# Patient Record
Sex: FEMALE | Race: WHITE | URBAN - METROPOLITAN AREA
[De-identification: names, ages, dates, MRNs, and addresses within clinical notes are randomized per-mention and may not be internally consistent; named-entity substitution may affect disease eponyms.]

---

## 2017-01-28 ENCOUNTER — TRANSFERRED RECORDS (OUTPATIENT)
Dept: HEALTH INFORMATION MANAGEMENT | Facility: CLINIC | Age: 77
End: 2017-01-28

## 2017-01-31 ENCOUNTER — TRANSFERRED RECORDS (OUTPATIENT)
Dept: HEALTH INFORMATION MANAGEMENT | Facility: CLINIC | Age: 77
End: 2017-01-31

## 2018-01-30 ENCOUNTER — OFFICE VISIT (OUTPATIENT)
Dept: INTERNAL MEDICINE | Facility: CLINIC | Age: 78
End: 2018-01-30
Payer: COMMERCIAL

## 2018-01-30 ENCOUNTER — TELEPHONE (OUTPATIENT)
Dept: PALLIATIVE MEDICINE | Facility: CLINIC | Age: 78
End: 2018-01-30

## 2018-01-30 VITALS
OXYGEN SATURATION: 95 % | RESPIRATION RATE: 16 BRPM | BODY MASS INDEX: 30.7 KG/M2 | DIASTOLIC BLOOD PRESSURE: 64 MMHG | WEIGHT: 152 LBS | TEMPERATURE: 97.2 F | SYSTOLIC BLOOD PRESSURE: 138 MMHG | HEART RATE: 73 BPM

## 2018-01-30 DIAGNOSIS — M54.41 CHRONIC BILATERAL LOW BACK PAIN WITH RIGHT-SIDED SCIATICA: ICD-10-CM

## 2018-01-30 DIAGNOSIS — H61.21 IMPACTED CERUMEN OF RIGHT EAR: Primary | ICD-10-CM

## 2018-01-30 DIAGNOSIS — G89.29 CHRONIC BILATERAL LOW BACK PAIN WITH RIGHT-SIDED SCIATICA: ICD-10-CM

## 2018-01-30 PROCEDURE — 99203 OFFICE O/P NEW LOW 30 MIN: CPT | Performed by: INTERNAL MEDICINE

## 2018-01-30 RX ORDER — CALCIUM CARBONATE 500(1250)
1 TABLET ORAL 2 TIMES DAILY
COMMUNITY

## 2018-01-30 RX ORDER — LATANOPROST 50 UG/ML
1 SOLUTION/ DROPS OPHTHALMIC AT BEDTIME
COMMUNITY

## 2018-01-30 RX ORDER — HYDROCODONE BITARTRATE AND ACETAMINOPHEN 10; 325 MG/1; MG/1
1 TABLET ORAL EVERY 6 HOURS PRN
COMMUNITY

## 2018-01-30 RX ORDER — FLUTICASONE PROPIONATE 50 MCG
1 SPRAY, SUSPENSION (ML) NASAL DAILY
COMMUNITY

## 2018-01-30 ASSESSMENT — PAIN SCALES - GENERAL: PAINLEVEL: SEVERE PAIN (7)

## 2018-01-30 NOTE — MR AVS SNAPSHOT
After Visit Summary   1/30/2018    Imani Cruz    MRN: [id number]           Patient Information     Date Of Birth          1940        Visit Information        Provider Department      1/30/2018 11:50 AM Romario Weinstein MD [facility]        Today's Diagnoses     Impacted cerumen of right ear    -  1    Chronic bilateral low back pain with right-sided sciatica          Care Instructions    After Visit Summary    * We irrigated your right ear today. If your symptoms do not improve, call us and we will refer you to ENT.    * Referral to pain management with Dr. Terrance Plasencia.        [facility]    If you have any questions regarding to your visit please contact your care team:     Team Pink:   Clinic Hours Telephone Number   Internal Medicine:  Dr. Selena Kinney NP       7am-7pm  Monday - Thursday   7am-5pm  Fridays  [phone]  (Appointment scheduling available 24/7)    Questions about your visit?  Team Line  [phone]   Urgent Care - [place] - 11am-9pm Monday-Friday Saturday-Sunday- 9am-5pm   [place] - 5pm-9pm Monday-Friday Saturday-Sunday- 9am-5pm  [phone] - [place]   [phone] - [place]       What options do I have for visits at the clinic other than the traditional office visit?  To expand how we care for you, many of our providers are utilizing electronic visits (e-visits) and telephone visits, when medically appropriate, for interactions with their patients rather than a visit in the clinic.   We also offer nurse visits for many medical concerns. Just like any other service, we will bill your insurance company for this type of visit based on time spent on the phone with your provider. Not all insurance companies cover these visits. Please check with your medical insurance if this type of visit is covered. You will be responsible for any charges that are not paid by your  insurance.      E-visits via [employer]:  generally incur a $35.00 fee.  Telephone visits:  Time spent on the phone: *charged based on time that is spent on the phone in increments of 10 minutes. Estimated cost:   5-10 mins $30.00   11-20 mins. $59.00   21-30 mins. $85.00   Use [employer] (secure email communication and access to your chart) to send your primary care provider a message or make an appointment. Ask someone on your Team how to sign up for [place].    For a Price Quote for your services, please call our RIVS Line at [phone].    As always, Thank you for trusting us with your health care needs!    Discharged By:  STEVIE DODD            Follow-ups after your visit        Additional Services     PAIN MANAGEMENT REFERRAL       Your provider has referred you to: [facility] with Dr. Terrance Plasencia    **ANY DIAGNOSTIC TESTS THAT ARE NOT IN EPIC SHOULD BE SENT TO THE PAIN CENTER**    REGARDING OPIOID MEDICATIONS:  The discussion of opioids management, appropriateness of therapy, and dosing will be discussed in patients being seen for evaluation.  The pain management clinics are not long-term prescribing clinics, with transition of prescribing of medications ultimately going back to the referring provider/PCP.  If prescribing is taken over at the pain clinic, it is in actively involved patients whom are appropriate for opioids, urine drug screening is completed, and long-term prescribing plan has been determined.  Therefore, we will not be automatically taking over prescribing at the patient's first visit.  Is this agreeable to you? agrees.     Please be aware that coverage of these services is subject to the terms and limitations of your health insurance plan.  Call member services at your health plan with any benefit or coverage questions.      Please bring the following with you to your appointment:    (1) Any X-Rays, CTs or MRIs which have been performed.  Contact the facility where  "they were done to arrange for  prior to your scheduled appointment.    (2) List of current medications   (3) This referral request   (4) Any documents/labs given to you for this referral                  Who to contact     If you have questions or need follow up information about today's clinic visit or your schedule please contact [facility] directly at [phone].  Normal or non-critical lab and imaging results will be communicated to you by MyChart, letter or phone within 4 business days after the clinic has received the results. If you do not hear from us within 7 days, please contact the clinic through WhatsNexxhart or phone. If you have a critical or abnormal lab result, we will notify you by phone as soon as possible.  Submit refill requests through Sofea or call your pharmacy and they will forward the refill request to us. Please allow 3 business days for your refill to be completed.          Additional Information About Your Visit        Whats[employer]exxharC[employer]ona Networks Information     Sofea lets you send messages to your doctor, view your test results, renew your prescriptions, schedule appointments and more. To sign up, go to [email or web address] . Click on \"Log in\" on the left side of the screen, which will take you to the Welcome page. Then click on \"Sign up Now\" on the right side of the page.     You will be asked to enter the access code listed below, as well as some personal information. Please follow the directions to create your username and password.     Your access code is: [email or web address]  Expires: 2018 12:37 PM     Your access code will  in 90 days. If you need help or a new code, please call your [facility] clinic or [phone].        Care EveryWhere ID     This is your Care EveryWhere ID. This could be used by other organizations to access your [facility] medical records  [id number]        Your Vitals Were     Pulse Temperature Respirations Pulse Oximetry BMI (Body Mass Index)       " 73 97.2  F (36.2  C) (Oral) 16 95% 30.7 kg/m2        Blood Pressure from Last 3 Encounters:   01/30/18 138/64   09/17/08 141/111   05/28/08 166/70    Weight from Last 3 Encounters:   01/30/18 152 lb (68.9 kg)   09/17/08 147 lb (66.7 kg)   05/28/08 150 lb (68 kg)              We Performed the Following     PAIN MANAGEMENT REFERRAL     REMOVE IMPACTED CERUMEN        Primary Care Provider Office Phone # Fax #    Izabela Gilliam [phone]       [facility] [address] [place] [address]        Equal Access to Services     [facility] BARBARA : Gwen Victor, wacorinne cast, qaconchitata kaalmada scot, lawson christianson. So [facility] [phone].    ATENCIÓN: Si habla español, tiene a calvert disposición servicios gratuitos de asistencia lingüística. Llame al [phone].    We comply with applicable federal civil rights laws and [place] laws. We do not discriminate on the basis of race, color, national origin, age, disability, sex, sexual orientation, or gender identity.            Thank you!     Thank you for choosing [facility] [place]  for your care. Our goal is always to provide you with excellent care. Hearing back from our patients is one way we can continue to improve our services. Please take a few minutes to complete the written survey that you may receive in the mail after your visit with us. Thank you!             Your Updated Medication List - Protect others around you: Learn how to safely use, store and throw away your medicines at www.disposemymeds.org.          This list is accurate as of 1/30/18 12:44 PM.  Always use your most recent med list.                   Brand Name Dispense Instructions for use Diagnosis    albuterol 4 MG tablet    PROVENTIL     1 TABLET 3 TIMES DAILY        ALBUTEROL IN      None Entered        * aspirin 325 MG EC tablet      1 TABLET EVERY 4 HOURS AS NEEDED        * aspirin 81 MG EC tablet      Take 81 mg by mouth  daily        calcium carbonate 1250 MG tablet    OS-KARLA 500 mg [place]. Ca     Take 1 tablet by mouth 2 times daily        CoQ10 100 MG Caps      None Entered        DILTIAZEM CD CP24# 120 MG OR      1 CAPSULE DAILY        fluticasone 50 MCG/ACT spray    FLONASE     Spray 1 spray into both nostrils daily        GABAPENTIN PO      Take 100 mg by mouth 3 times daily        HYDROcodone-acetaminophen  MG per tablet    NORCO     Take 1 tablet by mouth every 6 hours as needed for moderate to severe pain        K-DUR PO      Take 10 mEq by mouth        latanoprost 0.005 % ophthalmic solution    XALATAN     1 drop At Bedtime        LOSARTAN POTASSIUM PO      Take 50 mg by mouth daily        METOPROLOL TARTRATE PO      Take 25 mg by mouth 2 times daily        OMEPRAZOLE PO      Take 40 mg by mouth        OSCAL 500/200 D-3 500-200 MG-UNIT per tablet   Generic drug:  calcium carb 1250 mg (500 mg [place])/vitamin D 200 unit      1 TABLET DAILY        QVAR IN      None Entered        ranitidine 300 MG tablet    ZANTAC     1 TABLET AT BEDTIME        simvastatin 80 MG tablet    ZOCOR     1 TABLET AT BEDTIME        SINGULAIR 10 MG tablet   Generic drug:  montelukast      1 TABLET EVERY EVENING        TRAZODONE HCL PO      Take 100 mg by mouth 2 times daily        * Notice:  This list has 2 medication(s) that are the same as other medications prescribed for you. Read the directions carefully, and ask your doctor or other care provider to review them with you.

## 2018-01-30 NOTE — TELEPHONE ENCOUNTER
Left voicemail for patient to schedule new evaluation.         Paula ESPINAL    [facility]

## 2018-01-30 NOTE — PATIENT INSTRUCTIONS
After Visit Summary    * We irrigated your right ear today. If your symptoms do not improve, call us and we will refer you to ENT.    * Referral to pain management with Dr. Terrance Plasencia.        [facility]    If you have any questions regarding to your visit please contact your care team:     Team Pink:   Clinic Hours Telephone Number   Internal Medicine:  Dr. Selena Kinney, NP       7am-7pm  Monday - Thursday   7am-5pm  Fridays  [phone]  (Appointment scheduling available 24/7)    Questions about your visit?  Team Line  [phone]   [facility] [place] [facility]pm Monday-Friday Saturday-Sunday- 9am-5pm   [place] - 5pm-9pm Monday-Friday Saturday-Sunday- 9am-5pm  [phone] - [place]   [phone] - [place]       What options do I have for visits at the clinic other than the traditional office visit?  To expand how we care for you, many of our providers are utilizing electronic visits (e-visits) and telephone visits, when medically appropriate, for interactions with their patients rather than a visit in the clinic.   We also offer nurse visits for many medical concerns. Just like any other service, we will bill your insurance company for this type of visit based on time spent on the phone with your provider. Not all insurance companies cover these visits. Please check with your medical insurance if this type of visit is covered. You will be responsible for any charges that are not paid by your insurance.      E-visits via [employer]:  generally incur a $35.00 fee.  Telephone visits:  Time spent on the phone: *charged based on time that is spent on the phone in increments of 10 minutes. Estimated cost:   5-10 mins $30.00   11-20 mins. $59.00   21-30 mins. $85.00   Use [employer] (secure email communication and access to your chart) to send your primary care provider a message or make an appointment. Ask someone on your  Team how to sign up for F[employer]imp.    For a Price Quote for your services, please call our Consumer Price Line at [phone].    As always, Thank you for trusting us with your health care needs!    Discharged By:  STEVIE DODD

## 2018-01-30 NOTE — PROGRESS NOTES
SUBJECTIVE:   Imani Cruz is a 77 year old female who presents to clinic today for the following health issues:    Chief Complaint   Patient presents with     Referral     pain management     Ear Problem     Right ear feels plugged     This is a primary care patient with Dr. Izabela Talley who is an general internal medicine MD with [facility]. Patient has every intention of continuing her primary care with Dr. Talley so she specified for me what she had for goals form this office visit      Ear problem:  Continues to have wax build up of the right ear. She has had it irrigated in the past, unsuccessfully. Also has been put in ear drops to help soften the wax. Ear build up started not too long ago. Hopes we can successfully irrigate out by my medical assistant today     Request for referral:  Also would like a referral for pain management. She has known sciatica and back pain. She reports the sciatica improved a bit, then started to worsen again. She has a Medtronic stimulator that she thought would help with her pain, however is not helping to the extent she wanted. She was originally followed by Dr. Talley, then was referred to Dr. Brink at [facility]. Then had care with Dr. Ku from a different chronic pain clinic [ name uncertain ]. Since she is now covered by [facility], she is looking to transfer her chronic pain care to [place].     Problem list and histories reviewed & adjusted, as indicated.  Additional history: as documented    BP Readings from Last 3 Encounters:   01/30/18 138/64   09/17/08 141/111   05/28/08 166/70    Wt Readings from Last 3 Encounters:   01/30/18 68.9 kg (152 lb)   09/17/08 66.7 kg (147 lb)   05/28/08 68 kg (150 lb)           Reviewed and updated as needed this visit by clinical staff  Tobacco  Allergies  Meds  Med Hx  Surg Hx  Fam Hx  Soc Hx      Reviewed and updated as needed this visit by Provider         ROS:  Constitutional, HEENT, cardiovascular,  pulmonary, gi and gu systems are negative, except as otherwise noted.    This document serves as a record of the services and decisions personally performed and made by Romario Weinstein MD. It was created on his behalf by Jada Bruno, a trained medical scribe. The creation of this document is based the provider's statements to the medical scribe.    Scribsagrario Bruno 12:27 PM 1/30/2018      OBJECTIVE:     /64  Pulse 73  Temp 97.2  F (36.2  C) (Oral)  Resp 16  Wt 68.9 kg (152 lb)  SpO2 95%  BMI 30.7 kg/m2  Body mass index is 30.7 kg/(m^2).  GENERAL: healthy, alert and no distress  HENT: cerumen build up of the right ear. Bilateral TM's normal, nose and mouth without ulcers or lesions  NECK: no adenopathy, no asymmetry, masses, or scars and thyroid normal to palpation  RESP: lungs clear to auscultation - no rales, rhonchi or wheezes  CV: regular rate and rhythm, normal S1 S2, no S3 or S4, no murmur, click or rub, no peripheral edema and peripheral pulses strong  ABDOMEN: soft, nontender, no hepatosplenomegaly, no masses and bowel sounds normal  MS: no gross musculoskeletal defects noted, no edema    Diagnostic Test Results:  none     ASSESSMENT/PLAN:     1. Impacted cerumen of right ear  Successfully irrigated out by my medical assistant    - REMOVE IMPACTED CERUMEN    2. Chronic bilateral low back pain with right-sided sciatica  Referral placed for Dr. Mirna Plasencia with [facility]   - PAIN MANAGEMENT REFERRAL  I had patient sign a release of information for the medical records from [facility] with Dr Perry and Dr Rocha and also from Dr. Ku    The information in this document, created by a scribe for me, accurately reflects the services I personally performed and the decisions made by me. I have reviewed and approved this document for accuracy.      Romario Weinstein MD  [facility]

## 2019-08-26 ENCOUNTER — NURSE TRIAGE (OUTPATIENT)
Dept: NURSING | Facility: CLINIC | Age: 79
End: 2019-08-26

## 2019-08-27 NOTE — TELEPHONE ENCOUNTER
Patient reports she was recently in [facility]. She is trying to call the [facility] clinic to get prices of walkers. Patient was told [place] clinic can order walkers. Advised patient to call the clinic in the morning when they open. Caller verbalized understanding and had no further questions.     Rosalinda Sotelo RN/[facility]    Reason for Disposition    [1] Caller requesting NON-URGENT health information AND [2] PCP's office is the best resource    Protocols used: INFORMATION ONLY CALL-A-

## 2021-05-07 ENCOUNTER — RECORDS - HEALTHEAST (OUTPATIENT)
Dept: LAB | Facility: CLINIC | Age: 81
End: 2021-05-07

## 2021-05-11 LAB
ANION GAP SERPL CALCULATED.3IONS-SCNC: 7 MMOL/L (ref 5–18)
BASOPHILS # BLD AUTO: 0 THOU/UL (ref 0–0.2)
BASOPHILS NFR BLD AUTO: 0 % (ref 0–2)
BUN SERPL-MCNC: 12 MG/DL (ref 8–28)
CALCIUM SERPL-MCNC: 8.7 MG/DL (ref 8.5–10.5)
CHLORIDE BLD-SCNC: 98 MMOL/L (ref 98–107)
CO2 SERPL-SCNC: 41 MMOL/L (ref 22–31)
CREAT SERPL-MCNC: 0.56 MG/DL (ref 0.6–1.1)
EOSINOPHIL # BLD AUTO: 0.2 THOU/UL (ref 0–0.4)
EOSINOPHIL NFR BLD AUTO: 2 % (ref 0–6)
ERYTHROCYTE [DISTWIDTH] IN BLOOD BY AUTOMATED COUNT: 15 % (ref 11–14.5)
GFR SERPL CREATININE-BSD FRML MDRD: >60 ML/MIN/1.73M2
GLUCOSE BLD-MCNC: 148 MG/DL (ref 70–125)
HCT VFR BLD AUTO: 37 % (ref 35–47)
HGB BLD-MCNC: 10.9 G/DL (ref 12–16)
IMM GRANULOCYTES # BLD: 0 THOU/UL
IMM GRANULOCYTES NFR BLD: 1 %
LYMPHOCYTES # BLD AUTO: 1.8 THOU/UL (ref 0.8–4.4)
LYMPHOCYTES NFR BLD AUTO: 20 % (ref 20–40)
MAGNESIUM SERPL-MCNC: 2 MG/DL (ref 1.8–2.6)
MCH RBC QN AUTO: 29.9 PG (ref 27–34)
MCHC RBC AUTO-ENTMCNC: 29.5 G/DL (ref 32–36)
MCV RBC AUTO: 101 FL (ref 80–100)
MONOCYTES # BLD AUTO: 0.9 THOU/UL (ref 0–0.9)
MONOCYTES NFR BLD AUTO: 11 % (ref 2–10)
NEUTROPHILS # BLD AUTO: 5.8 THOU/UL (ref 2–7.7)
NEUTROPHILS NFR BLD AUTO: 66 % (ref 50–70)
PLATELET # BLD AUTO: 139 THOU/UL (ref 140–440)
PMV BLD AUTO: 12.8 FL (ref 8.5–12.5)
POTASSIUM BLD-SCNC: 3.3 MMOL/L (ref 3.5–5)
RBC # BLD AUTO: 3.65 MILL/UL (ref 3.8–5.4)
SODIUM SERPL-SCNC: 146 MMOL/L (ref 136–145)
WBC: 8.7 THOU/UL (ref 4–11)